# Patient Record
(demographics unavailable — no encounter records)

---

## 2024-11-12 NOTE — ASSESSMENT
[FreeTextEntry1] : Patient with celiac disease who reports being strictly gluten-free.  She is feeling well.  Bloodwork was sent for CBC, chem-pack, TSH, celiac markers, iron studies, vitamin B12, folate, vitamin A, vitamin D, vitamin K, magnesium, carotene, vitamin B6, zinc, copper, vitamin B1, vitamin B2, selenium, vitamin B3, vitamin B5, vitamin C.  Patient was sent for a bone density scan.  Patient is due for screening colonoscopy in 2026.  Assuming the blood work has no significant findings, the patient will return to see me in 1 year or sooner if needed.   Plan from 2/9/2021 - Patient with celiac disease with normal villi on endoscopy.  She is on a strict gluten-free diet.  She does complain of abdominal queasiness which she admits may be related to anxiety.  Her symptoms of nausea, vomiting, bloating, and diarrhea do seem to be significantly improved.  I had a long discussion with the patient and her  regarding her symptoms and possible etiologies.  The patient will try going on a lactose-free diet for 2 weeks to see if this helps with her symptoms.  The patient will call me after the lactose-free trial.  If she has ongoing symptoms, we will send her for breath testing to rule out small intestinal bacterial overgrowth.  We also discussed the role of anxiety and perhaps how counseling and/or antianxiety agents may help her GI symptoms.  Bloodwork will be sent for CBC, chem-pack, TSH, celiac markers, iron studies, B12, folate, vitamin A, vitamin D, vitamin K, magnesium, carotene, vitamin B6, zinc.  The patient was again reminded that she should go for a bone density scan.  We will repeat a colonoscopy in 5 to 10 years.   Plan from 11/3/2020 - Patient with a confirmed diagnosis of celiac disease for 16 years.  She reports being on a strict gluten-free diet but has not had any follow-up in many years.  She was doing well until March, when she began having intermittent episodes of diarrhea and nausea.  For the past 2 weeks, the patient has had abdominal pain and bloating with looser stools.  She also reports occasional dysphagia to solids.  Of note, the patient's son has eosinophilic esophagitis in addition to celiac disease.  Multiple etiologies of the patient's symptoms need to be considered including exacerbation of celiac disease, lymphocytic colitis, eosinophilic esophagitis, SIBO, and nonceliac associated diseases.  Of course, given the timing with the pandemic, anxiety is also a possible etiology.  I had a long discussion with the patient regarding celiac disease, the gluten-free diet, the concepts of contact and cross contamination, and the potential complications of celiac disease. We also discussed the need for ongoing followup.  Information was given to the patient regarding celiac disease and a gluten free diet. The patient was also given a list of local restaurants that are "celiac friendly".  Bloodwork was sent for CBC, chem-pack, TSH, celiac markers, iron studies, B12, folate, vitamin A, vitamin D, vitamin K, magnesium, carotene, vitamin B6, zinc, and celiac HLA testing.  Patient was sent for a bone density scan.  An EGD and colonoscopy have been scheduled. The risks, benefits, alternatives, and limitations of the procedures, including the possibility of missed lesions, were explained.

## 2024-11-12 NOTE — CONSULT LETTER
[FreeTextEntry1] : Dear Dr. Kemar Valenzuela,  I had the pleasure of seeing your patient CODI RAMIREZ in the office today.  My office note is attached. PLEASE READ THE "ASSESSMENT" SECTION OF THE NOTE TO SEE MY IMPRESSION AND PLAN.  Thank you very much for allowing me to participate in the care of your patient.  Sincerely,  John Granados M.D., FAC, FACP Director, Celiac Program at [facility]  of Medicine, [employer] at [facility] Adjunct  of Medicine, [facility] Practice Director, [facility] - Gastroenterology at [address] - [address] [place] [address] Tel: [phone] Email: [email or web address]   The attached note has been created using a voice recognition system (Dragon).  There may be some misspellings and typos.  Please call my office if you have any issues or questions.

## 2024-11-12 NOTE — HISTORY OF PRESENT ILLNESS
[FreeTextEntry1] : The patient is a 45-year-old woman with celiac disease who has not been seen in over 3-1/2 years.  She reports being on a strictly gluten-free diet.  She feels well denying abdominal pain, bloating, nausea, vomiting, heartburn, dysphagia.  She reports 1 solid bowel movement a day without melena.  She gets occasional bright red blood on the toilet paper and on the stool due to her known hemorrhoids.  The patient's weight is stable.  She denies rashes or joint pains.  The patient's last endoscopic evaluation was in December 2020 with both EGD and colonoscopy.  Her last bone density scan was over 10 years ago.  The patient has not been admitted to the hospital in the past year and denies any cardiac issues.   Note from 2/9/2021 - We reviewed the evaluations done since the patient's last visit on November 3, 2020.  The patient has celiac disease.  Blood work showed positive genetic findings of DQ 2.5 and 2.2 conveying a 16 times increased risk of the disease.  Celiac labs were normal other than a weakly positive tissue transglutaminase IgG of 6.6.  The patient underwent EGD and colonoscopy on December 4, 2020.  EGD was grossly normal with negative biopsies showing normal villi.  Colonoscopy revealed a redundant colon.  Nodular mucosa was seen in the terminal ileum but biopsies were negative.  Random right and left colonic biopsies were negative.  The patient has external hemorrhoids which are asymptomatic.  She is on a strict gluten-free diet.  She states that she awakens with a "queasy stomach" 2-3 times a week which she admits may be related to anxiety.  Her nausea and vomiting is better and she denies bloating.  She is generally having 1 solid bowel movement a day and has only had one episode of diarrhea over the past 2 to 3 months.  She denies melena or bright red blood per rectum.  The patient does have a fair amount of dairy in her diet.   Note from 11/3/2020 - The patient is a 41-year-old woman who was diagnosed with celiac disease in 2004.  We reviewed the results of her initial work-up.  Her TTG IgA was 109 with a gliadin antibody IgG of 105.  EGD was grossly normal but biopsies showed partial villous blunting and partial flattening of the villi consistent with celiac disease.  At that time, the patient had significant diarrhea and weight loss.  She went on a gluten-free diet and felt much better gaining weight.  She was seen by Mayra Valenzuela and Dr. Ezio Nevarez at [place].  She reports that her labs normalized but she has never had a follow-up endoscopy.  She was last seen by them in 2011 and has had no follow-up since.  The patient states that she is on a 100% gluten-free diet and understands the nuances of the diet including the concepts of contact and cross-contamination.  Of note, the patient's son has celiac disease as well, making the patient even more diligent about a gluten-free environment.  The patient states that on the very rare occasions when she has been inadvertently exposed to gluten in the past, she gets instant diarrhea.  Since March, when the pandemic began, the patient has had 4-5 episodes of diarrhea and nausea lasting about 3 days at a time.  The last episode was 2 weeks ago and lasted longer with exhaustion.  She went to an urgent care center where she reports having had a negative Covid test.  The symptoms lasted 5 days, improved, but then restarted a few days later.  The patient currently complains of abdominal pain, bloating, and soft stools.  She generally has 1 bowel movement a day which is solid but these have been softer recently.  She denies melena, bright red blood per rectum, or fevers.  She gets rare heartburn.  She does note occasional dysphagia to solids but no problems with liquids.  The patient's weight is stable.  The patient admits to excessive anxiety since the pandemic began.  Also of note, the patient's son has eosinophilic esophagitis in addition to celiac disease.   The patient has not been admitted to the hospital in the past year and denies any cardiac issues.

## 2025-01-20 NOTE — DISCUSSION/SUMMARY
[FreeTextEntry1] : Respiratory tract infection most likely viral. No evidence of significant underlying pulmonary parenchymal lung or airways disease. Possible forme fruste of very mild airways reactivity only manifested in the face of respiratory infections.  Cannot exclude very mild asthma. No indication for chronic bronchodilator therapy. History of asthmatic bronchitis without active wheezing at this time.

## 2025-01-20 NOTE — HISTORY OF PRESENT ILLNESS
[TextBox_4] : Got Covid shot last tuesday. Friday developed burning in chest. Tried pepcid without response. Persisted next day took COVID and FLu test negative. Last PM some chest tightness. Mild cough.  No significant upper congestion No pharyngitis or fever. Kids not ill.  In [place] Jane week.

## 2025-01-20 NOTE — PROCEDURE
[FreeTextEntry1] : 10/30/2023 Pulmonary function testing Normal Flow Rates Normal Lung Volumes.  There is a borderline mild diffusion impairment.  No

## 2025-01-20 NOTE — ASSESSMENT
[FreeTextEntry1] : Observation. RVP sent. Decision on further treatment pending above and clinical status.

## 2025-05-29 NOTE — CONSULT LETTER
[FreeTextEntry1] : Dear Dr. Kemar Valenzuela,  I had the pleasure of seeing your patient CODI RAMIREZ in the office today.  My office note is attached. PLEASE READ THE "ASSESSMENT" SECTION OF THE NOTE TO SEE MY IMPRESSION AND PLAN.  Thank you very much for allowing me to participate in the care of your patient.  Sincerely,  John Granados M.D., FAC, FACP Director, Celiac Program at [facility]  of Medicine, [employer] [facility] [employer] [facility] at [facility] [place] Adjunct  of Medicine, [facility] Practice Director, [facility] - Gastroenterology at [address] - [address] [place] [address] Tel: [phone] Email: [email or web address]   The attached note has been created using a voice recognition system (Dragon).  There may be some misspellings and typos.  Please call my office if you have any issues or questions.

## 2025-05-29 NOTE — ASSESSMENT
[FreeTextEntry1] : Patient with celiac disease who had an elevated deamidated gliadin antibody IgG on blood work in November.  She is strictly gluten-free although is not fully careful about cross-contamination.  We discussed the importance of watching out for cross-contamination, particularly utensils moving from gluten containing foods to gluten-free food.  Bloodwork was sent for CBC, chem-pack, TSH, celiac markers, iron studies, vitamin B12, folate, vitamin A, vitamin D, vitamin K, magnesium, carotene, vitamin B6, zinc, copper, vitamin B1, vitamin B2, selenium, vitamin B3, vitamin B5, vitamin C.  We will plan on EGD and colonoscopy in early 2026.  The patient will return to see me in 6 months.   Plan from 11/12/2024 - Patient with celiac disease who reports being strictly gluten-free.  She is feeling well.  Bloodwork was sent for CBC, chem-pack, TSH, celiac markers, iron studies, vitamin B12, folate, vitamin A, vitamin D, vitamin K, magnesium, carotene, vitamin B6, zinc, copper, vitamin B1, vitamin B2, selenium, vitamin B3, vitamin B5, vitamin C.  Patient was sent for a bone density scan.  Patient is due for screening colonoscopy in 2026.  Assuming the blood work has no significant findings, the patient will return to see me in 1 year or sooner if needed.   Plan from 2/9/2021 - Patient with celiac disease with normal villi on endoscopy.  She is on a strict gluten-free diet.  She does complain of abdominal queasiness which she admits may be related to anxiety.  Her symptoms of nausea, vomiting, bloating, and diarrhea do seem to be significantly improved.  I had a long discussion with the patient and her  regarding her symptoms and possible etiologies.  The patient will try going on a lactose-free diet for 2 weeks to see if this helps with her symptoms.  The patient will call me after the lactose-free trial.  If she has ongoing symptoms, we will send her for breath testing to rule out small intestinal bacterial overgrowth.  We also discussed the role of anxiety and perhaps how counseling and/or antianxiety agents may help her GI symptoms.  Bloodwork will be sent for CBC, chem-pack, TSH, celiac markers, iron studies, B12, folate, vitamin A, vitamin D, vitamin K, magnesium, carotene, vitamin B6, zinc.  The patient was again reminded that she should go for a bone density scan.  We will repeat a colonoscopy in 5 to 10 years.   Plan from 11/3/2020 - Patient with a confirmed diagnosis of celiac disease for 16 years.  She reports being on a strict gluten-free diet but has not had any follow-up in many years.  She was doing well until March, when she began having intermittent episodes of diarrhea and nausea.  For the past 2 weeks, the patient has had abdominal pain and bloating with looser stools.  She also reports occasional dysphagia to solids.  Of note, the patient's son has eosinophilic esophagitis in addition to celiac disease.  Multiple etiologies of the patient's symptoms need to be considered including exacerbation of celiac disease, lymphocytic colitis, eosinophilic esophagitis, SIBO, and nonceliac associated diseases.  Of course, given the timing with the pandemic, anxiety is also a possible etiology.  I had a long discussion with the patient regarding celiac disease, the gluten-free diet, the concepts of contact and cross contamination, and the potential complications of celiac disease. We also discussed the need for ongoing followup.  Information was given to the patient regarding celiac disease and a gluten free diet. The patient was also given a list of local restaurants that are "celiac friendly".  Bloodwork was sent for CBC, chem-pack, TSH, celiac markers, iron studies, B12, folate, vitamin A, vitamin D, vitamin K, magnesium, carotene, vitamin B6, zinc, and celiac HLA testing.  Patient was sent for a bone density scan.  An EGD and colonoscopy have been scheduled. The risks, benefits, alternatives, and limitations of the procedures, including the possibility of missed lesions, were explained.